# Patient Record
Sex: FEMALE | Race: WHITE | Employment: UNEMPLOYED
[De-identification: names, ages, dates, MRNs, and addresses within clinical notes are randomized per-mention and may not be internally consistent; named-entity substitution may affect disease eponyms.]

---

## 2024-09-24 ENCOUNTER — HOSPITAL ENCOUNTER (OUTPATIENT)
Facility: HOSPITAL | Age: 39
Setting detail: RECURRING SERIES
Discharge: HOME | End: 2024-09-27
Payer: OTHER GOVERNMENT

## 2024-09-24 PROCEDURE — 97535 SELF CARE MNGMENT TRAINING: CPT

## 2024-09-24 PROCEDURE — 97165 OT EVAL LOW COMPLEX 30 MIN: CPT

## 2024-09-24 PROCEDURE — 97110 THERAPEUTIC EXERCISES: CPT

## 2024-10-02 ENCOUNTER — HOSPITAL ENCOUNTER (OUTPATIENT)
Facility: HOSPITAL | Age: 39
Setting detail: RECURRING SERIES
Discharge: HOME | End: 2024-10-05
Payer: OTHER GOVERNMENT

## 2024-10-02 PROCEDURE — 97110 THERAPEUTIC EXERCISES: CPT

## 2024-10-02 PROCEDURE — 97530 THERAPEUTIC ACTIVITIES: CPT

## 2024-10-02 NOTE — PROGRESS NOTES
strength to 45# in pain free range, able to perform all flexbar exercises, gripper, and pincer exercises with no increase in symptoms. Pt did have allergic reaction to the KT tape so this was discontinued. If pt demonstrates improvement in all areas next session will plan to d/c.     Patient will continue to benefit from skilled PT / OT services to modify and progress therapeutic interventions, analyze and address functional mobility deficits, analyze and address ROM deficits, analyze and address strength deficits, analyze and address soft tissue restrictions, analyze and cue for proper movement patterns, and analyze and modify for postural abnormalities to address functional deficits and attain remaining goals.    Progress toward goals / Updated goals:  []  See Progress Note/Recertification    ST Weeks   Pt will be able to demonstrate HEP with visual cues only to ensure knowledge and carry over of HEP        Eval status given at [facility]   2. Pt will be able to improve  strength to over 20# in pain free range to improve with house hold chore performance   Eval status. 13#  10/2/24: 45#         LT weeks   Pt will be able to improve functional  strength to over 40# in pain free range to improve in heavy house hold chore performance   Eval status:  13# average   10/2/24: 45#   Pt will be able to improve lateral and 3pt pinch by 4# to improve ability to hold onto small objects   Eval status  3pt   3#    lateral: 10#   3. Patient will improve quickDASH score to unde 30 for self reported improvement in ADL/IADL tasks   Eval status: 45       PLAN  Yes  Continue plan of care  []  Upgrade activities as tolerated  []  Discharge due to :  []  Other:    SAEID Marquez, OTR/L, T     10/2/2024    7:05 AM    Future Appointments   Date Time Provider Department Center   10/2/2024  4:30 PM Jayro Abarca OT [facility]   10/9/2024  1:10 PM Jayro Abarca OT [facility]   10/16/2024 11:50 AM Jayro Abarca OT [facility]

## 2024-10-09 ENCOUNTER — HOSPITAL ENCOUNTER (OUTPATIENT)
Facility: HOSPITAL | Age: 39
Setting detail: RECURRING SERIES
Discharge: HOME | End: 2024-10-12
Payer: OTHER GOVERNMENT

## 2024-10-09 PROCEDURE — 97535 SELF CARE MNGMENT TRAINING: CPT

## 2024-10-09 PROCEDURE — 97530 THERAPEUTIC ACTIVITIES: CPT

## 2024-10-09 PROCEDURE — 97110 THERAPEUTIC EXERCISES: CPT

## 2024-10-09 NOTE — PROGRESS NOTES
PHYSICAL / OCCUPATIONAL THERAPY - DAILY TREATMENT NOTE     Patient Name: Cathy Peabody    Date: 10/9/2024    : 1985  Insurance: Payor:  EAST / Plan:  [employer] PRIME / Product Type: *No Product type* /      Patient  verified Yes     Visit #   Current / Total 3 4   Time   In / Out 115 145   Pain   In / Out 0 0   Subjective Functional Status/Changes: \"I can touch the arm now, it doesn't feel bad\"    Changes to:  Allergies, Med Hx, Sx Hx?   no       TREATMENT AREA =  Pain in left arm [M79.602]    If an interpreting service is utilized for treatment of this patient, the contents of this document represent the material reviewed with the patient via the .     OBJECTIVE        Therapeutic Procedures:  Tx Min Billable or 1:1 Min (if diff from Tx Min) Procedure, Rationale, Specifics   10  86695 Therapeutic Exercise (timed):  increase ROM, strength, coordination, balance, and proprioception to improve patient's ability to progress to PLOF and address remaining functional goals. (see flow sheet as applicable)    Details if applicable:       10  18743 Therapeutic Activity (timed):  use of dynamic activities replicating functional movements to increase ROM, strength, coordination, balance, and proprioception in order to improve patient's ability to progress to PLOF and address remaining functional goals.  (see flow sheet as applicable)    Details if applicable:     10  42622 Self Care/Home Management (timed):  improve patient knowledge and understanding of diagnosis/prognosis and physical therapy expectations, procedures and progression  to improve patient's ability to progress to PLOF and address remaining functional goals.  (see flow sheet as applicable)     Details if applicable:               30  [facility] Totals Reminder: bill using total billable min of TIMED therapeutic procedures (example: do not include dry needle or estim unattended, both untimed codes, in totals to left)  8-22 min = 1 unit;

## 2024-10-09 NOTE — PROGRESS NOTES
[facility]  [address] [place] [address]  Ph [phone]  Fx [phone]    Occupational Therapy Discharge Summary      Patient name: Cathy Peabody Start of Care:24   Referral source: Neeru Stanford MD : 1985   Medical/Treatment Diagnosis: Pain in left arm [M79.602] Onset Date:24     Prior Hospitalization: see medical history Provider#: [id number]   Medications: Verified on Patient Summary List    Comorbidities: Musculoskeletal disorders and Complications related to surgery   Prior Level of Function:Pt had full use of the left arm ; pt is a right hand dominant     Visits from Start of Care: 3    Missed Visits: 0    Reporting Period : 24 to 10/9/24    Goals/Measure of Progress:  ST Weeks   Pt will be able to demonstrate HEP with visual cues only to ensure knowledge and carry over of HEP        Eval status given at [facility]   10/9/24: goal met   2. Pt will be able to improve  strength to over 20# in pain free range to improve with house hold chore performance   Eval status. 13#  10/2/24: 45#   10/9/24: 52# average goal met            LT weeks   Pt will be able to improve functional  strength to over 40# in pain free range to improve in heavy house hold chore performance   Eval status:  13# average   10/2/24: 45#   10/9/24: 52# average, goal met   Pt will be able to improve lateral and 3pt pinch by 4# to improve ability to hold onto small objects   Eval status  3pt   3#    lateral: 10#   Lateral 14#   3pt: 13#   goal met   3. Patient will improve quickDASH score to unde 30 for self reported improvement in ADL/IADL tasks   Eval status: 45  10/9/24: 0 goal met        Assessment/ Summary of Care: Pt has been through course of 3 OT visits following needle stick injury in forearm from IV application and collection of medication Pt has returned to full functional use of the left upper extremity with no symptoms, 50# of  strength over 10# of pinch

## 2024-10-16 ENCOUNTER — APPOINTMENT (OUTPATIENT)
Facility: HOSPITAL | Age: 39
End: 2024-10-16
Payer: OTHER GOVERNMENT

## 2024-10-23 ENCOUNTER — APPOINTMENT (OUTPATIENT)
Facility: HOSPITAL | Age: 39
End: 2024-10-23
Payer: OTHER GOVERNMENT